# Patient Record
Sex: MALE | Race: WHITE
[De-identification: names, ages, dates, MRNs, and addresses within clinical notes are randomized per-mention and may not be internally consistent; named-entity substitution may affect disease eponyms.]

---

## 2017-10-04 ENCOUNTER — HOSPITAL ENCOUNTER (EMERGENCY)
Dept: HOSPITAL 74 - FER | Age: 22
Discharge: HOME | End: 2017-10-04
Payer: COMMERCIAL

## 2017-10-04 VITALS — TEMPERATURE: 98.9 F | HEART RATE: 67 BPM | SYSTOLIC BLOOD PRESSURE: 116 MMHG | DIASTOLIC BLOOD PRESSURE: 76 MMHG

## 2017-10-04 VITALS — BODY MASS INDEX: 26.2 KG/M2

## 2017-10-04 DIAGNOSIS — L23.7: Primary | ICD-10-CM

## 2017-10-04 PROCEDURE — 3E023GC INTRODUCTION OF OTHER THERAPEUTIC SUBSTANCE INTO MUSCLE, PERCUTANEOUS APPROACH: ICD-10-PCS

## 2017-10-04 NOTE — PDOC
History of Present Illness





- General


Chief Complaint: Poison Pickrell,Poison Ivy Exposure


Stated Complaint: POISON IVY LEFT EYE


Time Seen by Provider: 10/04/17 13:11





Past History





- Past Medical History


Allergies/Adverse Reactions: 


 Allergies











Allergy/AdvReac Type Severity Reaction Status Date / Time


 


shellfish derived Allergy  Hives Verified 10/04/17 13:06











Home Medications: 


Ambulatory Orders





Albuterol Sulfate Inhaler - [Ventolin HFA Inhaler -] 1 - 2 inh PO PRN PRN 10/04/

17 


Fexofenadine HCl [Allegra Allergy] 60 mg PO Q12H #20 tablet 10/04/17 











- Immunization History


Immunization Up to Date: Yes





- Suicide/Smoking/Psychosocial Hx


Smoking History: Never smoked


Have you smoked in the past 12 months: No


Hx Alcohol Use: Yes (SOCIAL)


Substance Use Type: Marijuana





*DC/Admit/Observation/Transfer


Diagnosis at time of Disposition: 


 Poison ivy





- Discharge Dispostion


Disposition: HOME


Condition at time of disposition: Stable


Admit: No





- Prescriptions


Prescriptions: 


Fexofenadine HCl [Allegra Allergy] 60 mg PO Q12H #20 tablet





- Referrals


Referrals: 


Noa Nelson [Staff Physician] - 





- Patient Instructions


Printed Discharge Instructions:  DI for Poison Ivy Allergy

## 2017-10-18 ENCOUNTER — HOSPITAL ENCOUNTER (EMERGENCY)
Dept: HOSPITAL 74 - FER | Age: 22
Discharge: HOME | End: 2017-10-18
Payer: COMMERCIAL

## 2017-10-18 VITALS — BODY MASS INDEX: 26.9 KG/M2

## 2017-10-18 VITALS — HEART RATE: 65 BPM | SYSTOLIC BLOOD PRESSURE: 116 MMHG | TEMPERATURE: 98.4 F | DIASTOLIC BLOOD PRESSURE: 69 MMHG

## 2017-10-18 DIAGNOSIS — S91.332A: Primary | ICD-10-CM
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
## 2017-10-18 PROCEDURE — 3E0234Z INTRODUCTION OF SERUM, TOXOID AND VACCINE INTO MUSCLE, PERCUTANEOUS APPROACH: ICD-10-PCS

## 2017-10-18 NOTE — PDOC
History of Present Illness





- General


History Source: Patient


Exam Limitations: No Limitations





- History of Present Illness


Initial Comments: 





10/18/17 17:07


Chief Complaint: left foot puncture wound





History of Present Illness: 23 y/o M with a h/o mild asthma presents to the ED 

with a left foot puncture wound. Patient was climbing a fence and slipped. His 

foot landed on a piece of the metal fence sticking out. He reports initial 

bleeding, but states the bleeding stopped just before arriving. No fever, 

chills. Denies any other complaints.





Review of Systems:


CONSTITUTIONAL: Absent: fever, chills, fatigue


MUSCULOSKELETAL: Absent: back pain, arthralgia, myalgia


SKIN: (+) left foot puncture wound. Absent: rash





Physical Exam confined to left foot: 5 mm puncture wound of the plantar aspect 

of the foot midsole. Wound appears to be clean, no foreign body palpable. 

Pulses in foot are intact, no distal sensory deficit, capillary refill intact 

in all 5 digits. No motor or tendon deficits.











<Esperanza Iglesias - Last Filed: 10/18/17 17:07>





<Kurt Mclain - Last Filed: 10/18/17 17:25>





- General


Chief Complaint: Pain, Acute


Stated Complaint: punctured my foot


Time Seen by Provider: 10/18/17 17:01





Past History





<Esperanza Iglesias - Last Filed: 10/18/17 17:07>





- Past Medical History


Asthma: Yes





- Immunization History


Immunization Up to Date: Yes





- Suicide/Smoking/Psychosocial Hx


Smoking History: Never smoked


Have you smoked in the past 12 months: No


Hx Alcohol Use: Yes


Drug/Substance Use Hx: No


Substance Use Type: Marijuana





<Kurt Mclain - Last Filed: 10/18/17 17:25>





- Past Medical History


Allergies/Adverse Reactions: 


 Allergies











Allergy/AdvReac Type Severity Reaction Status Date / Time


 


shellfish derived Allergy  Hives Verified 10/18/17 16:53











Home Medications: 


Ambulatory Orders





Albuterol Sulfate Inhaler - [Ventolin HFA Inhaler -] 1 - 2 inh PO PRN PRN 10/04/

17 











*Physical Exam





- Vital Signs


 Last Vital Signs











Temp Pulse Resp BP Pulse Ox


 


 98.4 F   65   16   116/69   98 


 


 10/18/17 16:52  10/18/17 16:52  10/18/17 16:52  10/18/17 16:52  10/18/17 16:52














<Esperanza Iglesias - Last Filed: 10/18/17 17:07>





- Vital Signs


 Last Vital Signs











Temp Pulse Resp BP Pulse Ox


 


 98.4 F   65   16   116/69   98 


 


 10/18/17 16:52  10/18/17 16:52  10/18/17 16:52  10/18/17 16:52  10/18/17 16:52














<Kurt Mclain - Last Filed: 10/18/17 17:25>





ED Treatment Course





- Medications


Given in the ED: 


ED Medications














Discontinued Medications














Generic Name Dose Route Start Last Admin





  Trade Name Nimo  PRN Reason Stop Dose Admin


 


Diphtheria/Tetanus/Acell Pertussis  0.5 ml 10/18/17 17:01 10/18/17 17:06





  Boostrix -  IM 10/18/17 17:02  0.5 ml





  ONCE ONE   Administration














<Esperanza Iglesias - Last Filed: 10/18/17 17:07>





- RADIOLOGY


Radiology Studies Ordered: 














 Category Date Time Status


 


 FOOT-LEFT [RAD] Stat Radiology  10/18/17 17:02 Ordered














<Kurt Mclain - Last Filed: 10/18/17 17:25>





Medical Decision Making





- Medical Decision Making


10/18/17 17:04


Patient with a puncture wound of the plantar aspect of the left foot. Sustained 

on a metal fence. No sensory or motor or circulatory deficits to the foot.





X-ray negative. No foreign body.





Soaked in sterile saline with Betadine. Scrubbed and explored, appears to be 

superficial. X-ray obtained foreign body. Dressed and bandaged. Rest and 

elevation for 48 hours recommended to minimize the chance of infection. Recheck 

if sign of infection develops, including increased pain, redness, swelling, or 

drainage from the wound.





Adequately ambulatory and in no significant pain or other distress upon 

discharge to follow-up as directed.





10/18/17 17:23








<Kurt Mclain - Last Filed: 10/18/17 17:25>





*DC/Admit/Observation/Transfer





- Attestations


Scribe Attestion: 





10/18/17 17:08





Documentation prepared by Esperanza Iglesias, acting as medical scribe for Kurt Sainz MD.





<Esperanza Iglesias - Last Filed: 10/18/17 17:07>





- Discharge Dispostion


Admit: No





<Kurt Mclain - Last Filed: 10/18/17 17:25>


Diagnosis at time of Disposition: 


Puncture wound of foot


Qualifiers:


 Encounter type: initial encounter Laterality: left Qualified Code(s): S91.332A 

- Puncture wound without foreign body, left foot, initial encounter; S91.332A - 

Puncture wound without foreign body, left foot, initial encounter





- Discharge Dispostion


Disposition: HOME


Condition at time of disposition: Improved





- Referrals


Referrals: 


Felipe Phan MD [Staff Physician] - 





- Patient Instructions


Printed Discharge Instructions:  DI for Puncture Wound


Additional Instructions: 


Rest and elevate the foot for 48 hours to minimize the chance of infection





Clean and dress the wound daily





Return to the ER if there is any sign of infection, or follow-up with 

orthopedist as directed.

## 2017-10-19 NOTE — PDOC
Patient Follow-up (Call Back)





- Post ED Follow - Up


Condition at time of discharge: Improved


Disposition at time of original discharge: HOME


Reason for Call Back: Radiology





- Disposition


Rx Needed: No


Additional Instructions/Notes: 


Notified by Dr. Moody that there was a possible FB nnoted on the xray vs. 

artifact.


CAlled the pt at the number listed on demographics and spoke to him, states his 

wound is in the arch of his foot, the possible fb was noted between 1st and 2nd 

toe on oblique projection - no where near the pts wound.


suspect artifact.


will hvae the pt fu with his pmd in a few days


return precautions were discussed

## 2018-11-01 ENCOUNTER — HOSPITAL ENCOUNTER (EMERGENCY)
Dept: HOSPITAL 74 - FER | Age: 23
Discharge: HOME | End: 2018-11-01
Payer: COMMERCIAL

## 2018-11-01 VITALS — BODY MASS INDEX: 26.2 KG/M2

## 2018-11-01 VITALS — TEMPERATURE: 98.5 F | DIASTOLIC BLOOD PRESSURE: 66 MMHG | SYSTOLIC BLOOD PRESSURE: 118 MMHG | HEART RATE: 70 BPM
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
## 2018-11-01 DIAGNOSIS — J45.909: ICD-10-CM
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
## 2018-11-01 DIAGNOSIS — M79.674: Primary | ICD-10-CM

## 2018-11-01 NOTE — PDOC
History of Present Illness





- General


Chief Complaint: Injury


Stated Complaint: RIGHT FOOT INJURY


Time Seen by Provider: 11/01/18 12:44





- History of Present Illness


Initial Comments: 





11/01/18 13:05


23 M with no PMH presents to ED after dropping a weight on his foot. Pt states 

he was doing "power cleans" and as he lifted the weight, his arm gave out and 

dropped the weight on the floor. He states that the right side of the barbell 

fell onto his R big toe. Pt was wearing shoes at the time. He states that the 

toe became swollen. He states that he has been able to bear weight and walk on 

his foot, though he does have pain.





Past History





- Past Medical History


Allergies/Adverse Reactions: 


 Allergies











Allergy/AdvReac Type Severity Reaction Status Date / Time


 


shellfish derived Allergy  Hives Verified 11/01/18 12:48











Home Medications: 


Ambulatory Orders





Lisdexamfetamine Dimesylate [Vyvanse] 30 mg PO HS 11/01/18 


Lisdexamfetamine Dimesylate [Vyvanse] 70 mg PO DAILY 11/01/18 








Asthma: Yes


COPD: No





- Immunization History


Immunization Up to Date: Yes





- Suicide/Smoking/Psychosocial Hx


Smoking History: Never smoked


Have you smoked in the past 12 months: No


Information on smoking cessation initiated: Yes


'Breaking Loose' booklet given: 11/01/18


Hx Alcohol Use: Yes


Drug/Substance Use Hx: Yes (MARIJUANA)


Substance Use Type: Marijuana





**Review of Systems





- Review of Systems


Comments:: 





11/01/18 13:08


"GENERAL/CONSTITUTIONAL: No fever or chills. No weakness.


HEAD, EYES, EARS, NOSE AND THROAT: No change in vision. No ear pain or 

discharge. No sore throat.


CARDIOVASCULAR: No chest pain, no shortness of breath, no loss of consciousness


RESPIRATORY: No cough, wheezing, or hemoptysis.


GASTROINTESTINAL: No nausea, vomiting, diarrhea or constipation.


GENITOURINARY: No dysuria, frequency, or change in urination.


MUSCULOSKELETAL: + R 1st toe pain


SKIN: No rash


NEUROLOGIC: No vertigo, no change in strength/sensation.


ENDOCRINE: No increased thirst. No abnormal weight change.


HEMATOLOGIC/LYMPHATIC: No anemia, easy bleeding, or history of blood clots.


ALLERGIC/IMMUNOLOGIC: No hives or skin allergy.








*Physical Exam





- Vital Signs


 Last Vital Signs











Temp Pulse Resp BP Pulse Ox


 


 98.5 F   70   15   118/66   99 


 


 11/01/18 12:41  11/01/18 12:41  11/01/18 12:41  11/01/18 12:41  11/01/18 12:41














- Physical Exam


Comments: 





11/01/18 13:09


"GENERAL: Awake, alert, and fully oriented, in no acute distress.


HEAD: No signs of trauma


EYES: PERRLA, EOMI, sclera anicteric, conjunctiva clear


ENT: Auricles normal inspection, hearing grossly normal, nares patent, 

oropharynx clear without exudates. Moist mucosa


NECK: Nontender, no stepoffs, Normal ROM, supple, no lymphadenopathy, JVD, or 

masses


LUNGS: Breath sounds equal, clear to auscultation bilaterally.  No wheezes, and 

no crackles


HEART: Regular rate and rhythm, normal S1 and S2, no murmurs, rubs or gallops


ABDOMEN: Soft, nontender, normoactive bowel sounds.  No guarding, no rebound.  

No masses


EXTREMITIES: + RLE with mild tenderness over 1st middle phalanx, no deformity, 

neurovascularly intact


NEUROLOGICAL: Cranial nerves II through XII intact. 5/5 strength and sensation 

in all extremities, Normal speech, normal gait, normal cerebellar function


SKIN: Warm, Dry, normal turgor, no rashes or lesions noted.





ED Treatment Course





- RADIOLOGY


Radiology Studies Ordered: 














 Category Date Time Status


 


 FOOT-RIGHT [RAD] Stat Radiology  11/01/18 12:59 Ordered














Medical Decision Making





- Medical Decision Making





11/01/18 13:10


23 M with R 1st toe pain after dropping barbell on it.


- XR R foot





11/01/18 13:35


XR negative for fx.


Pt is well appearing, with normal vitals. Clinically stable for DC at this time.


I discussed the physical exam findings, ancillary test results and final 

diagnoses with the patient. I answered all of the patient's questions. The 

patient was satisfied with the care received and felt comfortable with the 

discharge plan and treatment plan.  The patient agrees to follow up with the 

primary care physician within 24-72 hours.








*DC/Admit/Observation/Transfer


Diagnosis at time of Disposition: 


 Foot pain








- Discharge Dispostion


Disposition: HOME


Condition at time of disposition: Good





- Referrals


Referrals: 


Felipe Phan MD [Staff Physician] - 


Yehuda Mora MD [Staff Physician] - 





- Patient Instructions


Printed Discharge Instructions:  DI for Foot Pain


Additional Instructions: 


Avoid bearing any weight on your right foot until the pain and swelling have 

resolved.


Keep your foot elevated and apply ice to help reduce the swelling.


If you experience worsening pain, swelling, or any other concerning symptoms, 

return to the ER immediately.


If you continue to have pain after 48 hours, you should follow up with an 

orthopedist for further evaluation. Call the number provided to make an 

appointment.





- Post Discharge Activity





- Attestations


Physician Attestion: 





11/01/18 13:27








I, Dr. Felipe Abdul MD, attest that this document has been prepared under my 

direction and personally reviewed by me in its entirety.   I further attest, 

that it accurately reflects all work, treatment, procedures and medical decision

-making performed by me.

## 2019-12-06 ENCOUNTER — TRANSCRIPTION ENCOUNTER (OUTPATIENT)
Age: 24
End: 2019-12-06

## 2020-01-14 ENCOUNTER — HOSPITAL ENCOUNTER (EMERGENCY)
Dept: HOSPITAL 74 - JER | Age: 25
Discharge: LEFT BEFORE DISCHARGE | End: 2020-01-14
Payer: SELF-PAY

## 2020-01-14 VITALS — TEMPERATURE: 97.8 F | SYSTOLIC BLOOD PRESSURE: 119 MMHG | DIASTOLIC BLOOD PRESSURE: 67 MMHG | HEART RATE: 71 BPM

## 2020-01-14 VITALS — BODY MASS INDEX: 26.2 KG/M2

## 2020-01-14 DIAGNOSIS — Z53.21: Primary | ICD-10-CM

## 2020-01-14 LAB
ALBUMIN SERPL-MCNC: 4 G/DL (ref 3.4–5)
ALP SERPL-CCNC: 63 U/L (ref 45–117)
ALT SERPL-CCNC: 34 U/L (ref 13–61)
ANION GAP SERPL CALC-SCNC: 3 MMOL/L (ref 8–16)
AST SERPL-CCNC: 29 U/L (ref 15–37)
BASOPHILS # BLD: 0.5 % (ref 0–2)
BILIRUB SERPL-MCNC: 0.6 MG/DL (ref 0.2–1)
BUN SERPL-MCNC: 17 MG/DL (ref 7–18)
CALCIUM SERPL-MCNC: 8.9 MG/DL (ref 8.5–10.1)
CHLORIDE SERPL-SCNC: 108 MMOL/L (ref 98–107)
CO2 SERPL-SCNC: 29 MMOL/L (ref 21–32)
CREAT SERPL-MCNC: 1.1 MG/DL (ref 0.55–1.3)
DEPRECATED RDW RBC AUTO: 13 % (ref 11.9–15.9)
EOSINOPHIL # BLD: 2.5 % (ref 0–4.5)
GLUCOSE SERPL-MCNC: 85 MG/DL (ref 74–106)
HCT VFR BLD CALC: 43.5 % (ref 35.4–49)
HGB BLD-MCNC: 15.1 GM/DL (ref 11.7–16.9)
LYMPHOCYTES # BLD: 23.6 % (ref 8–40)
MAGNESIUM SERPL-MCNC: 2.4 MG/DL (ref 1.8–2.4)
MCH RBC QN AUTO: 30 PG (ref 25.7–33.7)
MCHC RBC AUTO-ENTMCNC: 34.6 G/DL (ref 32–35.9)
MCV RBC: 86.6 FL (ref 80–96)
MONOCYTES # BLD AUTO: 8.5 % (ref 3.8–10.2)
NEUTROPHILS # BLD: 64.9 % (ref 42.8–82.8)
PLATELET # BLD AUTO: 270 K/MM3 (ref 134–434)
PMV BLD: 7.7 FL (ref 7.5–11.1)
POTASSIUM SERPLBLD-SCNC: 4.3 MMOL/L (ref 3.5–5.1)
PROT SERPL-MCNC: 7.4 G/DL (ref 6.4–8.2)
RBC # BLD AUTO: 5.02 M/MM3 (ref 4–5.6)
SODIUM SERPL-SCNC: 140 MMOL/L (ref 136–145)
WBC # BLD AUTO: 8.1 K/MM3 (ref 4–10)

## 2020-01-14 NOTE — PDOC
History of Present Illness





- General


Chief Complaint: Alcohol intoxication


Stated Complaint: DETOX


Time Seen by Provider: 01/14/20 17:36





Past History





- Past Medical History


Allergies/Adverse Reactions: 


 Allergies











Allergy/AdvReac Type Severity Reaction Status Date / Time


 


shellfish derived Allergy  Hives Verified 01/14/20 17:32











Home Medications: 


Ambulatory Orders





Lisdexamfetamine Dimesylate [Vyvanse] 30 mg PO HS 11/01/18 


Lisdexamfetamine Dimesylate [Vyvanse] 70 mg PO DAILY 11/01/18 








Asthma: Yes


COPD: No





- Immunization History


Immunization Up to Date: Yes





- Psycho Social/Smoking Cessation Hx


Smoking History: Never smoked


Have you smoked in the past 12 months: No


'Breaking Loose' booklet given: 11/01/18


Hx Alcohol Use: No


Drug/Substance Use Hx: No


Substance Use Type: Marijuana





*Physical Exam





- Vital Signs


 Last Vital Signs











Temp Pulse Resp BP Pulse Ox


 


 97.8 F   71   17   119/67   100 


 


 01/14/20 17:33  01/14/20 17:33  01/14/20 17:33  01/14/20 17:33  01/14/20 17:33














ED Treatment Course





- LABORATORY


CBC & Chemistry Diagram: 


 01/14/20 17:43





 01/14/20 17:43





- ADDITIONAL ORDERS


Additional order review: 


 Laboratory  Results











  01/14/20 01/14/20





  17:43 17:43


 


Sodium  140 


 


Potassium  4.3 


 


Chloride  108 H 


 


Carbon Dioxide  29 


 


Anion Gap  3 L 


 


BUN  17.0 


 


Creatinine  1.1 


 


Est GFR (CKD-EPI)AfAm  106.81 


 


Est GFR (CKD-EPI)NonAf  92.16 


 


Random Glucose  85 


 


Calcium  8.9 


 


Magnesium  2.4 


 


Total Bilirubin  0.6 


 


AST  29 


 


ALT  34 


 


Alkaline Phosphatase  63 


 


Total Protein  7.4 


 


Albumin  4.0 


 


Alcohol, Quantitative   < 3








 











  01/14/20





  17:43


 


RBC  5.02


 


MCV  86.6


 


MCHC  34.6


 


RDW  13.0


 


MPV  7.7


 


Neutrophils %  64.9


 


Lymphocytes %  23.6


 


Monocytes %  8.5


 


Eosinophils %  2.5


 


Basophils %  0.5














Medical Decision Making





- Medical Decision Making





01/14/20 22:07


Multiple attempts to find patient have been unsuccessful.  As were unable to 

find the patient is likely he eloped.





Discharge





- Discharge Information


Problems reviewed: Yes


Clinical Impression/Diagnosis: 


 Eloped from emergency department





Disposition: ELOPED





- Follow up/Referral





- Patient Discharge Instructions





- Post Discharge Activity

## 2022-05-15 ENCOUNTER — NON-APPOINTMENT (OUTPATIENT)
Age: 27
End: 2022-05-15

## 2023-07-28 ENCOUNTER — NON-APPOINTMENT (OUTPATIENT)
Age: 28
End: 2023-07-28

## 2023-10-19 ENCOUNTER — APPOINTMENT (OUTPATIENT)
Dept: INTERNAL MEDICINE | Facility: CLINIC | Age: 28
End: 2023-10-19
Payer: MEDICAID

## 2023-10-19 VITALS
HEIGHT: 75 IN | OXYGEN SATURATION: 98 % | RESPIRATION RATE: 18 BRPM | HEART RATE: 75 BPM | SYSTOLIC BLOOD PRESSURE: 120 MMHG | WEIGHT: 225 LBS | BODY MASS INDEX: 27.98 KG/M2 | DIASTOLIC BLOOD PRESSURE: 68 MMHG | TEMPERATURE: 98.1 F

## 2023-10-19 DIAGNOSIS — F98.8 OTHER SPECIFIED BEHAVIORAL AND EMOTIONAL DISORDERS WITH ONSET USUALLY OCCURRING IN CHILDHOOD AND ADOLESCENCE: ICD-10-CM

## 2023-10-19 DIAGNOSIS — Z00.00 ENCOUNTER FOR GENERAL ADULT MEDICAL EXAMINATION W/OUT ABNORMAL FINDINGS: ICD-10-CM

## 2023-10-19 DIAGNOSIS — Z02.79 ENCOUNTER FOR ISSUE OF OTHER MEDICAL CERTIFICATE: ICD-10-CM

## 2023-10-19 PROCEDURE — 99385 PREV VISIT NEW AGE 18-39: CPT | Mod: 25

## 2023-10-24 ENCOUNTER — NON-APPOINTMENT (OUTPATIENT)
Age: 28
End: 2023-10-24

## 2023-10-24 LAB
ALBUMIN SERPL ELPH-MCNC: 5.4 G/DL
ALP BLD-CCNC: 79 U/L
ALT SERPL-CCNC: 15 U/L
ANION GAP SERPL CALC-SCNC: 14 MMOL/L
AST SERPL-CCNC: 20 U/L
BILIRUB SERPL-MCNC: 0.5 MG/DL
BUN SERPL-MCNC: 12 MG/DL
C TRACH RRNA SPEC QL NAA+PROBE: NOT DETECTED
CALCIUM SERPL-MCNC: 10.2 MG/DL
CHLORIDE SERPL-SCNC: 102 MMOL/L
CHOLEST SERPL-MCNC: 181 MG/DL
CO2 SERPL-SCNC: 25 MMOL/L
CREAT SERPL-MCNC: 0.97 MG/DL
EGFR: 108 ML/MIN/1.73M2
ESTIMATED AVERAGE GLUCOSE: 91 MG/DL
GLUCOSE SERPL-MCNC: 70 MG/DL
HBA1C MFR BLD HPLC: 4.8 %
HBV CORE IGG+IGM SER QL: NONREACTIVE
HBV SURFACE AB SER QL: NONREACTIVE
HBV SURFACE AG SER QL: NONREACTIVE
HCT VFR BLD CALC: 49.5 %
HCV AB SER QL: NONREACTIVE
HCV S/CO RATIO: 0.2 S/CO
HDLC SERPL-MCNC: 65 MG/DL
HGB BLD-MCNC: 16.2 G/DL
HIV1+2 AB SPEC QL IA.RAPID: NONREACTIVE
LDLC SERPL CALC-MCNC: 94 MG/DL
M TB IFN-G BLD-IMP: NEGATIVE
MCHC RBC-ENTMCNC: 30.1 PG
MCHC RBC-ENTMCNC: 32.7 GM/DL
MCV RBC AUTO: 91.8 FL
MEV IGG FLD QL IA: 7.5 AU/ML
MEV IGG+IGM SER-IMP: NEGATIVE
MUV AB SER-ACNC: NEGATIVE
MUV IGG SER QL IA: 5.2 AU/ML
N GONORRHOEA RRNA SPEC QL NAA+PROBE: NOT DETECTED
NONHDLC SERPL-MCNC: 116 MG/DL
PLATELET # BLD AUTO: 296 K/UL
POTASSIUM SERPL-SCNC: 4.3 MMOL/L
PROT SERPL-MCNC: 8.4 G/DL
QUANTIFERON TB PLUS MITOGEN MINUS NIL: 8.68 IU/ML
QUANTIFERON TB PLUS NIL: 0.02 IU/ML
QUANTIFERON TB PLUS TB1 MINUS NIL: 0.03 IU/ML
QUANTIFERON TB PLUS TB2 MINUS NIL: 0.01 IU/ML
RBC # BLD: 5.39 M/UL
RBC # FLD: 12.9 %
RUBV IGG FLD-ACNC: 0.9 INDEX
RUBV IGG SER-IMP: NORMAL
SODIUM SERPL-SCNC: 142 MMOL/L
SOURCE AMPLIFICATION: NORMAL
T PALLIDUM AB SER QL IA: NEGATIVE
TRIGL SERPL-MCNC: 128 MG/DL
TSH SERPL-ACNC: 0.82 UIU/ML
VZV AB TITR SER: POSITIVE
VZV IGG SER IF-ACNC: 174.7 INDEX
WBC # FLD AUTO: 7.05 K/UL